# Patient Record
Sex: FEMALE | Race: WHITE
[De-identification: names, ages, dates, MRNs, and addresses within clinical notes are randomized per-mention and may not be internally consistent; named-entity substitution may affect disease eponyms.]

---

## 2018-09-17 ENCOUNTER — HOSPITAL ENCOUNTER (OUTPATIENT)
Dept: HOSPITAL 47 - RADMRIMAIN | Age: 48
Discharge: HOME | End: 2018-09-17
Attending: FAMILY MEDICINE
Payer: COMMERCIAL

## 2018-09-17 DIAGNOSIS — M50.222: Primary | ICD-10-CM

## 2018-09-17 PROCEDURE — 72141 MRI NECK SPINE W/O DYE: CPT

## 2018-09-17 NOTE — MR
EXAMINATION TYPE: MR cervical spine wo con

 

DATE OF EXAM: 9/17/2018

 

COMPARISON: None

 

HISTORY: Cervicalgia

 

TECHNIQUE: 

Multiplanar, multisequence images of the cervical spine were acquired.

 

C2-C3: No evidence for degenerative disc disease.  No disc bulge/herniation or protrusion.  No Canal 
stenosis.  Foramina are patent bilaterally.

 

C3-C4: No evidence for degenerative disc disease.  No disc bulge/herniation or protrusion.  No Canal 
stenosis.  Foramina are patent bilaterally.

 

C4-C5: No evidence for degenerative disc disease.  No disc bulge/herniation or protrusion.  No Canal 
stenosis.  Foramina are patent bilaterally.

 

C5-C6: There is central broad based disc bulge with more focal protrusion centrally. This comes in cl
ose approximation with the spinal cord. No AP spinal canal stenosis present. In the sagittal T1 weigh
chiara images there appears to be some subligamentous disc extension. Uncovertebral joint atrophy is pre
sent with some moderate foraminal stenosis bilaterally.

 

C6-C7: Mild right paracentral disc bulge is present with mild anterior thecal sac compression. No AP 
spinal canal stenosis present. No cord contact is evident. Subligamentous disc extension may be prese
nt on the sagittal T1 images. Neural foramen are patent.

 

C7-T1: No evidence for degenerative disc disease.  No disc bulge/herniation or protrusion.  No Canal 
stenosis.  Foramina are patent bilaterally.

 

Vertebral body heights are preserved. Mild disc desiccation is present diffusely. Disc heights appear
 preserved.

 

IMPRESSION:

 

1. Central disc bulging with subligamentous disc extension C5-6 with moderate anterior thecal sac com
pression.

2. Mild right paracentral disc bulging with possible subligamentous disc extension at C6-7 with mild 
anterior thecal sac compression.

## 2018-11-07 ENCOUNTER — HOSPITAL ENCOUNTER (OUTPATIENT)
Dept: HOSPITAL 47 - PNWHC3 | Age: 48
Discharge: HOME | End: 2018-11-07
Attending: ANESTHESIOLOGY
Payer: COMMERCIAL

## 2018-11-07 VITALS — RESPIRATION RATE: 18 BRPM | HEART RATE: 99 BPM

## 2018-11-07 VITALS — SYSTOLIC BLOOD PRESSURE: 161 MMHG | DIASTOLIC BLOOD PRESSURE: 115 MMHG

## 2018-11-07 VITALS — BODY MASS INDEX: 25.6 KG/M2

## 2018-11-07 DIAGNOSIS — M50.222: ICD-10-CM

## 2018-11-07 DIAGNOSIS — Z79.899: ICD-10-CM

## 2018-11-07 DIAGNOSIS — R51: Primary | ICD-10-CM

## 2018-11-07 DIAGNOSIS — F17.210: ICD-10-CM

## 2018-11-07 PROCEDURE — 99211 OFF/OP EST MAY X REQ PHY/QHP: CPT

## 2018-11-07 NOTE — P.CONS
History of Present Illness





- Reason for Consult


Consult date: 11/07/18





- Chief Complaint


Occipital headache





- History of Present Illness


This is a 48-year-old lady with a one-year history of occipital headache that 

started with no precipitating events.  The headache has been getting worse.  

She describes her pain as steady pain in the back of her head which extends to 

the frontal areas of her head.  There are no exacerbating or alleviating 

factors.  The pain is worse when she wakes up in the morning.  It did occur up 

at night however not frequently.  The patient denies any numbness or tingling 

in the upper or lower extremities she also denies any weakness in the 

extremities or any bowel or bladder dysfunction.  The patient had an MRI of the 

cervical spine which showed central disc bulging at C5-C6 level with moderate 

anterior thecal sac compression and mild right paracentral disc bulging at C6 7 

level with mild anterior thecal sac compression.  She sometimes feels nauseated 

with this pain but she denies any photophobia.  This pain sometimes last for 

couple of days as she states.


She uses Flexeril and Norco for this pain.  She works at [employer] and she has 

been off duty over the last 3 months she is scheduled to go back to work on 

December 15 of this year.








Review of Systems


Constitutional: Denies chills, Denies fever


Eyes: denies blurred vision, denies pain


Ears, nose, mouth and throat: Reports as per HPI, Denies sore throat


Cardiovascular: Denies chest pain, Denies shortness of breath


Respiratory: Denies cough


Gastrointestinal: Denies abdominal pain, Denies diarrhea, Denies nausea, Denies 

vomiting


Musculoskeletal: Reports as per HPI


Neurological: Reports as per HPI





Past Medical History


Additional Past Medical History / Comment(s): RAYNAUD'S


History of Any Multi-Drug Resistant Organisms: None Reported


Past Surgical History: Tubal Ligation


Past Anesthesia/Blood Transfusion Reactions: No Reported Reaction


Past Psychological History: No Psychological Hx Reported


Smoking Status: Current every day smoker


Past Alcohol Use History: None Reported


Additional Past Alcohol Use History / Comment(s): SMOKES 1 PPD FOR PAST 30 YEARS


Past Drug Use History: None Reported





- Past Family History


  ** Father


Family Medical History: Cancer





  ** Mother


Family Medical History: Cancer





Medications and Allergies


 Home Medications











 Medication  Instructions  Recorded  Confirmed  Type


 


Cyclobenzaprine [Flexeril] 5 mg PO TID 11/06/18 11/07/18 History


 


HYDROcodone/APAP 5-325MG [Norco 1 tab PO Q6HR PRN 11/06/18 11/07/18 History





5-325]    











 Allergies











Allergy/AdvReac Type Severity Reaction Status Date / Time


 


No Known Allergies Allergy   Verified 11/07/18 12:20














Physical Exam


Vitals: 


 Vital Signs











  Pulse Resp BP Pulse Ox


 


 11/07/18 12:21  99  18  161/115  97








 Intake and Output











 11/06/18 11/07/18 11/07/18





 22:59 06:59 14:59


 


Other:   


 


  Weight 63.503 kg  














- Constitutional


General appearance: average body habitus





- EENT


Eyes: PERRLA





- Respiratory


Respiratory: bilateral: CTA





- Cardiovascular


Rhythm: regular





- Integumentary


Integumentary: no calor, no cellulitis, no cyanotic, no decreased turgor, no 

flushed, no jaundiced, no normal, no normal turgor, no pale, no rash, no ulcer





- Neurologic


Neuro exam of the upper extremities showed normal and symmetrical deep tendon 

reflexes and normal and symmetrical muscle strength.  She has mild tenderness 

on the right side of her cervical spine in the paravertebral musculature and 

also more moderate tenderness in the occipital area on both sides more on the 

right side than the left side.  She has normal range of motion of the cervical 

spine.





Neurologic: CNII-XII intact





- Musculoskeletal


Musculoskeletal: gait normal





- Psychiatric


Psychiatric: A&O x's 3, appropriate affect, intact judgment & insight





Results


Results: 


The patient had an MRI of the cervical spine which showed central disc bulging 

at C5-C6 level with moderate anterior thecal sac compression and mild right 

paracentral disc bulging at C6 7 level with mild anterior thecal sac 

compression.








Assessment and Plan


Plan: 


This is a 48-year-old lady with occipital headache.  The patient may benefit 

from getting occipital nerve block bilaterally for 1 time and if this  does not 

help her pain then we'll plan on doing the cervical C2, C3 and third occipital 

nerve block under fluoroscopic guidance in the future bilaterally.  Both 

procedures were explained to the patient and her questions were answered.


The patient has disc bulging at the lower levels of her cervical spine which 

does not explain her current symptoms and I think they are not responsible for 

her current pain.  She also has normal neurologic examination.

## 2018-11-28 ENCOUNTER — HOSPITAL ENCOUNTER (OUTPATIENT)
Dept: HOSPITAL 47 - ORPAIN | Age: 48
End: 2018-11-28
Payer: COMMERCIAL

## 2018-11-28 VITALS — HEART RATE: 95 BPM | SYSTOLIC BLOOD PRESSURE: 117 MMHG | DIASTOLIC BLOOD PRESSURE: 87 MMHG

## 2018-11-28 VITALS — BODY MASS INDEX: 25.6 KG/M2

## 2018-11-28 VITALS — RESPIRATION RATE: 18 BRPM

## 2018-11-28 DIAGNOSIS — F17.210: ICD-10-CM

## 2018-11-28 DIAGNOSIS — I73.00: ICD-10-CM

## 2018-11-28 DIAGNOSIS — Z79.899: ICD-10-CM

## 2018-11-28 DIAGNOSIS — M50.222: ICD-10-CM

## 2018-11-28 DIAGNOSIS — M54.81: Primary | ICD-10-CM

## 2018-11-28 PROCEDURE — 64405 NJX AA&/STRD GR OCPL NRV: CPT

## 2020-03-08 ENCOUNTER — HOSPITAL ENCOUNTER (INPATIENT)
Dept: HOSPITAL 47 - EC | Age: 50
LOS: 2 days | Discharge: HOME | DRG: 192 | End: 2020-03-10
Attending: HOSPITALIST | Admitting: HOSPITALIST
Payer: COMMERCIAL

## 2020-03-08 DIAGNOSIS — F17.200: ICD-10-CM

## 2020-03-08 DIAGNOSIS — Z79.899: ICD-10-CM

## 2020-03-08 DIAGNOSIS — I73.00: ICD-10-CM

## 2020-03-08 DIAGNOSIS — Z98.51: ICD-10-CM

## 2020-03-08 DIAGNOSIS — J44.0: ICD-10-CM

## 2020-03-08 DIAGNOSIS — Z80.1: ICD-10-CM

## 2020-03-08 DIAGNOSIS — I10: ICD-10-CM

## 2020-03-08 DIAGNOSIS — J44.1: Primary | ICD-10-CM

## 2020-03-08 DIAGNOSIS — E78.5: ICD-10-CM

## 2020-03-08 DIAGNOSIS — J20.9: ICD-10-CM

## 2020-03-08 DIAGNOSIS — R51: ICD-10-CM

## 2020-03-08 LAB
ALBUMIN SERPL-MCNC: 4.7 G/DL (ref 3.5–5)
ALP SERPL-CCNC: 67 U/L (ref 38–126)
ALT SERPL-CCNC: 20 U/L (ref 4–34)
ANION GAP SERPL CALC-SCNC: 8 MMOL/L
APTT BLD: 25.8 SEC (ref 22–30)
AST SERPL-CCNC: 29 U/L (ref 14–36)
BASOPHILS # BLD AUTO: 0.1 K/UL (ref 0–0.2)
BASOPHILS NFR BLD AUTO: 1 %
BUN SERPL-SCNC: 12 MG/DL (ref 7–17)
CALCIUM SPEC-MCNC: 9.6 MG/DL (ref 8.4–10.2)
CHLORIDE SERPL-SCNC: 103 MMOL/L (ref 98–107)
CO2 SERPL-SCNC: 27 MMOL/L (ref 22–30)
EOSINOPHIL # BLD AUTO: 0.3 K/UL (ref 0–0.7)
EOSINOPHIL NFR BLD AUTO: 3 %
ERYTHROCYTE [DISTWIDTH] IN BLOOD BY AUTOMATED COUNT: 4.85 M/UL (ref 3.8–5.4)
ERYTHROCYTE [DISTWIDTH] IN BLOOD: 12.8 % (ref 11.5–15.5)
GLUCOSE BLD-MCNC: 129 MG/DL (ref 75–99)
GLUCOSE SERPL-MCNC: 109 MG/DL (ref 74–99)
HCT VFR BLD AUTO: 46.8 % (ref 34–46)
HGB BLD-MCNC: 15.4 GM/DL (ref 11.4–16)
INR PPP: 1 (ref ?–1.2)
LYMPHOCYTES # SPEC AUTO: 2.6 K/UL (ref 1–4.8)
LYMPHOCYTES NFR SPEC AUTO: 26 %
MAGNESIUM SPEC-SCNC: 2 MG/DL (ref 1.6–2.3)
MCH RBC QN AUTO: 31.7 PG (ref 25–35)
MCHC RBC AUTO-ENTMCNC: 32.8 G/DL (ref 31–37)
MCV RBC AUTO: 96.6 FL (ref 80–100)
MONOCYTES # BLD AUTO: 0.7 K/UL (ref 0–1)
MONOCYTES NFR BLD AUTO: 8 %
NEUTROPHILS # BLD AUTO: 5.8 K/UL (ref 1.3–7.7)
NEUTROPHILS NFR BLD AUTO: 59 %
PH UR: 6 [PH] (ref 5–8)
PLATELET # BLD AUTO: 246 K/UL (ref 150–450)
POTASSIUM SERPL-SCNC: 4.4 MMOL/L (ref 3.5–5.1)
PROT SERPL-MCNC: 7.8 G/DL (ref 6.3–8.2)
PT BLD: 10 SEC (ref 9–12)
RBC UR QL: 9 /HPF (ref 0–5)
SODIUM SERPL-SCNC: 138 MMOL/L (ref 137–145)
SP GR UR: 1.02 (ref 1–1.03)
SQUAMOUS UR QL AUTO: 2 /HPF (ref 0–4)
UROBILINOGEN UR QL STRIP: <2 MG/DL (ref ?–2)
WBC # BLD AUTO: 9.8 K/UL (ref 3.8–10.6)
WBC #/AREA URNS HPF: 10 /HPF (ref 0–5)

## 2020-03-08 PROCEDURE — 85610 PROTHROMBIN TIME: CPT

## 2020-03-08 PROCEDURE — 85025 COMPLETE CBC W/AUTO DIFF WBC: CPT

## 2020-03-08 PROCEDURE — 85730 THROMBOPLASTIN TIME PARTIAL: CPT

## 2020-03-08 PROCEDURE — 87502 INFLUENZA DNA AMP PROBE: CPT

## 2020-03-08 PROCEDURE — 94640 AIRWAY INHALATION TREATMENT: CPT

## 2020-03-08 PROCEDURE — 87040 BLOOD CULTURE FOR BACTERIA: CPT

## 2020-03-08 PROCEDURE — 96375 TX/PRO/DX INJ NEW DRUG ADDON: CPT

## 2020-03-08 PROCEDURE — 96361 HYDRATE IV INFUSION ADD-ON: CPT

## 2020-03-08 PROCEDURE — 94760 N-INVAS EAR/PLS OXIMETRY 1: CPT

## 2020-03-08 PROCEDURE — 83880 ASSAY OF NATRIURETIC PEPTIDE: CPT

## 2020-03-08 PROCEDURE — 80048 BASIC METABOLIC PNL TOTAL CA: CPT

## 2020-03-08 PROCEDURE — 99285 EMERGENCY DEPT VISIT HI MDM: CPT

## 2020-03-08 PROCEDURE — 71046 X-RAY EXAM CHEST 2 VIEWS: CPT

## 2020-03-08 PROCEDURE — 84484 ASSAY OF TROPONIN QUANT: CPT

## 2020-03-08 PROCEDURE — 80053 COMPREHEN METABOLIC PANEL: CPT

## 2020-03-08 PROCEDURE — 83735 ASSAY OF MAGNESIUM: CPT

## 2020-03-08 PROCEDURE — 96374 THER/PROPH/DIAG INJ IV PUSH: CPT

## 2020-03-08 PROCEDURE — 93005 ELECTROCARDIOGRAM TRACING: CPT

## 2020-03-08 PROCEDURE — 36415 COLL VENOUS BLD VENIPUNCTURE: CPT

## 2020-03-08 PROCEDURE — 81001 URINALYSIS AUTO W/SCOPE: CPT

## 2020-03-08 PROCEDURE — 85379 FIBRIN DEGRADATION QUANT: CPT

## 2020-03-08 PROCEDURE — 83605 ASSAY OF LACTIC ACID: CPT

## 2020-03-08 RX ADMIN — METHYLPREDNISOLONE SODIUM SUCCINATE SCH MG: 125 INJECTION, POWDER, FOR SOLUTION INTRAMUSCULAR; INTRAVENOUS at 23:30

## 2020-03-08 RX ADMIN — INSULIN ASPART SCH: 100 INJECTION, SOLUTION INTRAVENOUS; SUBCUTANEOUS at 21:18

## 2020-03-08 RX ADMIN — CEFAZOLIN SCH MLS/HR: 330 INJECTION, POWDER, FOR SOLUTION INTRAMUSCULAR; INTRAVENOUS at 23:31

## 2020-03-08 RX ADMIN — HEPARIN SODIUM SCH UNIT: 5000 INJECTION, SOLUTION INTRAVENOUS; SUBCUTANEOUS at 21:51

## 2020-03-08 RX ADMIN — ATORVASTATIN CALCIUM SCH MG: 20 TABLET, FILM COATED ORAL at 21:22

## 2020-03-08 NOTE — HP
HISTORY AND PHYSICAL



CHIEF COMPLAINT:

Shortness of breath.



HISTORY OF PRESENT ILLNESS:

This 49-year-old woman with a past medical history of multiple medical problems

including hypertension, hyperlipidemia, history of Raynaud's being followed by Dr. Kg Bazzi in the outpatient setting, not feeling well for the past several days.  The

patient continues to smoke, but the patient is trying to quit with Chantix at this

time.  Because of worsening shortness of breath, with cough and sputum,  patient came

to [facility] emergency Room and was admitted for further evaluation and

treatment.  Initial breathing treatments made the patient feeling better but after

taking oxygen saturation dipped down, the patient was admitted for evaluation and

treatment.  The chest x-ray did not show acute abnormality.  The patient was apparently

scheduled for PFT next week.  There is no history of fever, rigors, chills at this

time.



PAST MEDICAL HISTORY:

History of hypertension, hyperlipidemia, history of Raynaud's.



MEDICATIONS:

Prior to admission include home medications are Chantix 1 mg p.o. b.i.d., Prinivil 10

mg p.o. daily, Lipitor 10 mg q.h.s., ProAir 1 to 2 puffs q.4 p.r.n.



ALLERGIES:

None.



FAMILY HISTORY:

History of cancer in the family.



SOCIAL HISTORY:

History of smoking, continued ongoing less than a pack of cigarettes per day.  No

history of alcohol intake.



REVIEW OF SYSTEMS:

ENT: No diminished vision. No diminished hearing.

CARDIOVASCULAR: No angina or palpitations.

RESPIRATORY: As mentioned earlier.  GI no nausea or vomiting.  no dysuria or

hematuria.

Nervous system: No numbness or weakness.

Allergy/Immunology: No asthma or hayfever.

Musculoskeletal as mentioned earlier.

HEMATOLOGY/ONCOLOGY: No history of anemia.

ENDOCRINE:  No history of diabetes or hypothyroidism.

Constitutional: As mentioned earlier.

DERMATOLOGY:  Negative.

RHEUMATOLOGY negative.

PSYCHIATRY as mentioned earlier.



PHYSICAL EXAMINATION:

Alert and oriented times three.  Pulse 90. Blood pressure 178/92, respiration 20,

temperature 97.2, pulse ox 98% on room air.

HEENT: Conjunctivae normal.

NECK: No JVD.

Cardiovascular: S1, S2 muffled.

Respiration: Breath sounds diminished in the bases.  Bilateral scattered rhonchi and

crackles.  Expiratory wheezing also present.

ABDOMEN:  Soft, nontender.  No mass palpable.

LEGS:  No edema. No swelling.

NERVOUS SYSTEM: Higher functions as mentioned earlier. Moves all four limbs.

LYMPHATICS: No lymph nodes palpable in the neck, axillae or groin.

SKIN: No ulcer, no rash and no bleeding.

JOINTS no active deforming arthropathy.



LABS:

CBC within normal limits and glucose 109.  UA noted.



ASSESSMENT:

1. Chronic obstructive pulmonary disease acute exacerbation with acute purulent

    tracheobronchitis.

2. History of nicotine dependence, continued ongoing.

3. History of hypertension.

4. Hyperlipidemia.

5. History of Raynaud's phenomenon.

6.



RECOMMENDATIONS AND DISCUSSION:

In this 49-year-old woman who presented with multiple complex medical issues, we will

monitor the patient closely, continue the current medications, management and

symptomatic treatment. Otherwise,  we will increase the dose of lisinopril, steroids.

Monitor blood sugars closely, intensive bronchodilators, antibiotics.  The prognosis

guarded because of multiple complex medical issues.  Further recommendations to follow.

A copy of this dictation being forwarded to Dr. Kg Bazzi who is the primary

physician. We will stop the IV fluids.  I would also do a D-dimer and if it is

positive, order a CT angio of the chest.





MMODL / IJN: [id number] / Job#: [id number]

## 2020-03-08 NOTE — XR
EXAMINATION TYPE: XR chest 2V

 

DATE OF EXAM: 3/8/2020

 

COMPARISON: NONE

 

HISTORY: Short of breath

 

TECHNIQUE:

 

FINDINGS: Heart and mediastinum are normal. Lungs are clear. Diaphragm is normal. Bony thorax is inta
ct. There are chest leads.

 

IMPRESSION: No active cardiopulmonary disease. Normal heart.

## 2020-03-08 NOTE — ED
General Adult HPI





- General


Chief complaint: Shortness of Breath


Stated complaint: MÓNICA


Time Seen by Provider: 03/08/20 16:48


Source: patient, RN notes reviewed, old records reviewed


Mode of arrival: ambulatory


Limitations: no limitations





- History of Present Illness


Initial comments: 





Patient is a 49-year-old female who presents emergency Department today with 

complaints of shortness of breath, dyspnea on exertion.  She reports that she is

a smoker and trying to quit at this time with Chantix.  She reports that she's 

had a worsening cough and worsening shortness of breath with even short 

distances with walking for the past 3 days.  She states that her cough is 

nonproductive.  She has not been officially diagnosed with COPD.  She reports 

that she's been having progressive symptoms of shortness of breath and worsening

wheezing and cough since January.  She states she is scheduled to have PFTs next

week.





- Related Data


                                Home Medications











 Medication  Instructions  Recorded  Confirmed


 


Lisinopril [Prinivil] 10 mg PO DAILY 11/28/18 03/08/20


 


Albuterol Sulfate [Proair Hfa] 1 - 2 puff INHALATION RT-Q4H PRN 03/08/20 03/08/20


 


Atorvastatin [Lipitor] 20 mg PO HS 03/08/20 03/08/20


 


Varenicline [Chantix Continuing 1 mg PO BID 03/08/20 03/08/20





Pack]   











                                    Allergies











Allergy/AdvReac Type Severity Reaction Status Date / Time


 


No Known Allergies Allergy   Verified 03/08/20 17:20














Review of Systems


ROS Statement: 


Those systems with pertinent positive or pertinent negative responses have been 

documented in the HPI.





ROS Other: All systems not noted in ROS Statement are negative.





Past Medical History


Past Medical History: Hyperlipidemia, Hypertension


Additional Past Medical History / Comment(s): RAYNAUD'S


History of Any Multi-Drug Resistant Organisms: None Reported


Past Surgical History: Tubal Ligation


Past Anesthesia/Blood Transfusion Reactions: No Reported Reaction


Past Psychological History: No Psychological Hx Reported


Smoking Status: Current every day smoker


Past Alcohol Use History: None Reported


Past Drug Use History: None Reported





- Past Family History


  ** Father


Family Medical History: Cancer





  ** Mother


Family Medical History: Cancer





General Exam





- General Exam Comments


Initial Comments: 





Pleasant 49-year-old female.  Alert and oriented.  No distress.


Limitations: no limitations


General appearance: alert, in no apparent distress


Head exam: Present: atraumatic


Eye exam: Present: normal appearance, PERRL, EOMI.  Absent: scleral icterus, 

conjunctival injection, periorbital swelling


ENT exam: Present: normal exam, mucous membranes moist


Neck exam: Present: normal inspection.  Absent: tenderness, meningismus, 

lymphadenopathy


Respiratory exam: Present: wheezes (bilaterally), decreased breath sounds.  

Absent: normal lung sounds bilaterally, respiratory distress, rales, rhonchi, 

stridor, accessory muscle use


Cardiovascular Exam: Present: regular rate, normal rhythm, normal heart sounds. 

Absent: systolic murmur, diastolic murmur, rubs, gallop, clicks


GI/Abdominal exam: Present: soft, normal bowel sounds.  Absent: distended, 

tenderness, guarding, rebound, rigid


Extremities exam: Present: normal inspection, full ROM, normal capillary refill.

 Absent: tenderness, pedal edema, joint swelling, calf tenderness


Back exam: Present: normal inspection


Neurological exam: Present: alert, oriented X3, CN II-XII intact


Psychiatric exam: Present: normal affect, normal mood





Course


                                   Vital Signs











  03/08/20 03/08/20 03/08/20





  16:42 17:02 17:05


 


Temperature 97.9 F  


 


Pulse Rate 93  93


 


Respiratory 22 18 





Rate   


 


Blood Pressure 178/98  


 


O2 Sat by Pulse 96  





Oximetry   














  03/08/20 03/08/20 03/08/20





  17:17 19:05 19:16


 


Temperature   


 


Pulse Rate 90 96 98


 


Respiratory   18





Rate   


 


Blood Pressure   171/101


 


O2 Sat by Pulse   95





Oximetry   














  03/08/20





  19:17


 


Temperature 


 


Pulse Rate 105 H


 


Respiratory 





Rate 


 


Blood Pressure 


 


O2 Sat by Pulse 





Oximetry 














Medical Decision Making





- Medical Decision Making





49-year-old female presenting for his murmurs today for cough and worsening 

shortness breath with exertion wheezing worsening over the past 2-3 days.  She 

reports that she has not been officially diagnosed with COPD.  On exam she is 

wheezing.  Patient was given IV fluids, steroids and breathing treatments.  

Wheezing has improved.  Patient had an ambulatory test around the emergency 

department and pulse ox went as well as 80% Patient was very winded and felt 

lightheaded at that time.  On reevaluation after this she continue to have some 

wheezing.  Patient case discussed with Dr. Cheng.  Due to persistent hypoxia on 

exertion wheezing  will admit the Patient for COPD exacerbation.  She was given 

Rocephin and azithromycin.





- Lab Data


Result diagrams: 


                                 03/08/20 17:24





                                 03/08/20 17:24


                                   Lab Results











  03/08/20 03/08/20 03/08/20 Range/Units





  17:24 17:24 17:24 


 


WBC  9.8    (3.8-10.6)  k/uL


 


RBC  4.85    (3.80-5.40)  m/uL


 


Hgb  15.4    (11.4-16.0)  gm/dL


 


Hct  46.8 H    (34.0-46.0)  %


 


MCV  96.6    (80.0-100.0)  fL


 


MCH  31.7    (25.0-35.0)  pg


 


MCHC  32.8    (31.0-37.0)  g/dL


 


RDW  12.8    (11.5-15.5)  %


 


Plt Count  246    (150-450)  k/uL


 


Neutrophils %  59    %


 


Lymphocytes %  26    %


 


Monocytes %  8    %


 


Eosinophils %  3    %


 


Basophils %  1    %


 


Neutrophils #  5.8    (1.3-7.7)  k/uL


 


Lymphocytes #  2.6    (1.0-4.8)  k/uL


 


Monocytes #  0.7    (0-1.0)  k/uL


 


Eosinophils #  0.3    (0-0.7)  k/uL


 


Basophils #  0.1    (0-0.2)  k/uL


 


PT   10.0   (9.0-12.0)  sec


 


INR   1.0   (<1.2)  


 


APTT   25.8   (22.0-30.0)  sec


 


Sodium    138  (137-145)  mmol/L


 


Potassium    4.4  (3.5-5.1)  mmol/L


 


Chloride    103  ()  mmol/L


 


Carbon Dioxide    27  (22-30)  mmol/L


 


Anion Gap    8  mmol/L


 


BUN    12  (7-17)  mg/dL


 


Creatinine    0.63  (0.52-1.04)  mg/dL


 


Est GFR (CKD-EPI)AfAm    >90  (>60 ml/min/1.73 sqM)  


 


Est GFR (CKD-EPI)NonAf    >90  (>60 ml/min/1.73 sqM)  


 


Glucose    109 H  (74-99)  mg/dL


 


Plasma Lactic Acid Georgi     (0.7-2.0)  mmol/L


 


Calcium    9.6  (8.4-10.2)  mg/dL


 


Magnesium    2.0  (1.6-2.3)  mg/dL


 


Total Bilirubin    0.4  (0.2-1.3)  mg/dL


 


AST    29  (14-36)  U/L


 


ALT    20  (4-34)  U/L


 


Alkaline Phosphatase    67  ()  U/L


 


Troponin I     (0.000-0.034)  ng/mL


 


NT-Pro-B Natriuret Pep     pg/mL


 


Total Protein    7.8  (6.3-8.2)  g/dL


 


Albumin    4.7  (3.5-5.0)  g/dL


 


Urine Color     


 


Urine Appearance     (Clear)  


 


Urine pH     (5.0-8.0)  


 


Ur Specific Gravity     (1.001-1.035)  


 


Urine Protein     (Negative)  


 


Urine Glucose (UA)     (Negative)  


 


Urine Ketones     (Negative)  


 


Urine Blood     (Negative)  


 


Urine Nitrite     (Negative)  


 


Urine Bilirubin     (Negative)  


 


Urine Urobilinogen     (<2.0)  mg/dL


 


Ur Leukocyte Esterase     (Negative)  


 


Urine RBC     (0-5)  /hpf


 


Urine WBC     (0-5)  /hpf


 


Ur Squamous Epith Cells     (0-4)  /hpf


 


Urine Bacteria     (None)  /hpf


 


Urine Mucus     (None)  /hpf














  03/08/20 03/08/20 03/08/20 Range/Units





  17:24 17:24 17:24 


 


WBC     (3.8-10.6)  k/uL


 


RBC     (3.80-5.40)  m/uL


 


Hgb     (11.4-16.0)  gm/dL


 


Hct     (34.0-46.0)  %


 


MCV     (80.0-100.0)  fL


 


MCH     (25.0-35.0)  pg


 


MCHC     (31.0-37.0)  g/dL


 


RDW     (11.5-15.5)  %


 


Plt Count     (150-450)  k/uL


 


Neutrophils %     %


 


Lymphocytes %     %


 


Monocytes %     %


 


Eosinophils %     %


 


Basophils %     %


 


Neutrophils #     (1.3-7.7)  k/uL


 


Lymphocytes #     (1.0-4.8)  k/uL


 


Monocytes #     (0-1.0)  k/uL


 


Eosinophils #     (0-0.7)  k/uL


 


Basophils #     (0-0.2)  k/uL


 


PT     (9.0-12.0)  sec


 


INR     (<1.2)  


 


APTT     (22.0-30.0)  sec


 


Sodium     (137-145)  mmol/L


 


Potassium     (3.5-5.1)  mmol/L


 


Chloride     ()  mmol/L


 


Carbon Dioxide     (22-30)  mmol/L


 


Anion Gap     mmol/L


 


BUN     (7-17)  mg/dL


 


Creatinine     (0.52-1.04)  mg/dL


 


Est GFR (CKD-EPI)AfAm     (>60 ml/min/1.73 sqM)  


 


Est GFR (CKD-EPI)NonAf     (>60 ml/min/1.73 sqM)  


 


Glucose     (74-99)  mg/dL


 


Plasma Lactic Acid Georgi  1.0    (0.7-2.0)  mmol/L


 


Calcium     (8.4-10.2)  mg/dL


 


Magnesium     (1.6-2.3)  mg/dL


 


Total Bilirubin     (0.2-1.3)  mg/dL


 


AST     (14-36)  U/L


 


ALT     (4-34)  U/L


 


Alkaline Phosphatase     ()  U/L


 


Troponin I   <0.012   (0.000-0.034)  ng/mL


 


NT-Pro-B Natriuret Pep    59  pg/mL


 


Total Protein     (6.3-8.2)  g/dL


 


Albumin     (3.5-5.0)  g/dL


 


Urine Color     


 


Urine Appearance     (Clear)  


 


Urine pH     (5.0-8.0)  


 


Ur Specific Gravity     (1.001-1.035)  


 


Urine Protein     (Negative)  


 


Urine Glucose (UA)     (Negative)  


 


Urine Ketones     (Negative)  


 


Urine Blood     (Negative)  


 


Urine Nitrite     (Negative)  


 


Urine Bilirubin     (Negative)  


 


Urine Urobilinogen     (<2.0)  mg/dL


 


Ur Leukocyte Esterase     (Negative)  


 


Urine RBC     (0-5)  /hpf


 


Urine WBC     (0-5)  /hpf


 


Ur Squamous Epith Cells     (0-4)  /hpf


 


Urine Bacteria     (None)  /hpf


 


Urine Mucus     (None)  /hpf














  03/08/20 Range/Units





  18:10 


 


WBC   (3.8-10.6)  k/uL


 


RBC   (3.80-5.40)  m/uL


 


Hgb   (11.4-16.0)  gm/dL


 


Hct   (34.0-46.0)  %


 


MCV   (80.0-100.0)  fL


 


MCH   (25.0-35.0)  pg


 


MCHC   (31.0-37.0)  g/dL


 


RDW   (11.5-15.5)  %


 


Plt Count   (150-450)  k/uL


 


Neutrophils %   %


 


Lymphocytes %   %


 


Monocytes %   %


 


Eosinophils %   %


 


Basophils %   %


 


Neutrophils #   (1.3-7.7)  k/uL


 


Lymphocytes #   (1.0-4.8)  k/uL


 


Monocytes #   (0-1.0)  k/uL


 


Eosinophils #   (0-0.7)  k/uL


 


Basophils #   (0-0.2)  k/uL


 


PT   (9.0-12.0)  sec


 


INR   (<1.2)  


 


APTT   (22.0-30.0)  sec


 


Sodium   (137-145)  mmol/L


 


Potassium   (3.5-5.1)  mmol/L


 


Chloride   ()  mmol/L


 


Carbon Dioxide   (22-30)  mmol/L


 


Anion Gap   mmol/L


 


BUN   (7-17)  mg/dL


 


Creatinine   (0.52-1.04)  mg/dL


 


Est GFR (CKD-EPI)AfAm   (>60 ml/min/1.73 sqM)  


 


Est GFR (CKD-EPI)NonAf   (>60 ml/min/1.73 sqM)  


 


Glucose   (74-99)  mg/dL


 


Plasma Lactic Acid Georgi   (0.7-2.0)  mmol/L


 


Calcium   (8.4-10.2)  mg/dL


 


Magnesium   (1.6-2.3)  mg/dL


 


Total Bilirubin   (0.2-1.3)  mg/dL


 


AST   (14-36)  U/L


 


ALT   (4-34)  U/L


 


Alkaline Phosphatase   ()  U/L


 


Troponin I   (0.000-0.034)  ng/mL


 


NT-Pro-B Natriuret Pep   pg/mL


 


Total Protein   (6.3-8.2)  g/dL


 


Albumin   (3.5-5.0)  g/dL


 


Urine Color  Yellow  


 


Urine Appearance  Cloudy H  (Clear)  


 


Urine pH  6.0  (5.0-8.0)  


 


Ur Specific Gravity  1.022  (1.001-1.035)  


 


Urine Protein  Negative  (Negative)  


 


Urine Glucose (UA)  Negative  (Negative)  


 


Urine Ketones  Negative  (Negative)  


 


Urine Blood  Small H  (Negative)  


 


Urine Nitrite  Positive H  (Negative)  


 


Urine Bilirubin  Negative  (Negative)  


 


Urine Urobilinogen  <2.0  (<2.0)  mg/dL


 


Ur Leukocyte Esterase  Moderate H  (Negative)  


 


Urine RBC  9 H  (0-5)  /hpf


 


Urine WBC  10 H  (0-5)  /hpf


 


Ur Squamous Epith Cells  2  (0-4)  /hpf


 


Urine Bacteria  Many H  (None)  /hpf


 


Urine Mucus  Rare H  (None)  /hpf














03/08/20 17:03


EKG performed at 1655 shows normal sinus rhythm with sinus arrhythmia.  Normal 

EKG noted.  Ventricular rate of 88 bpm.  IL interval is 140 ms.  QRS duration is

84 ms.  QT QTc is 366/442 ms.





- Radiology Data


Radiology results: report reviewed


Chest x-rays shows no active cardio pulmonary disease.  Normal heart.





Disposition


Clinical Impression: 


 COPD exacerbation, Smoker, Hypoxia, Dyspnea on exertion





Disposition: ADMITTED AS IP TO THIS HOSP


Condition: Stable


Is patient prescribed a controlled substance at d/c from ED?: No


Referrals: 


Haseeb Bazzi MD [Primary Care Provider] - 1-2 days


Time of Disposition: 19:28

## 2020-03-09 LAB
ANION GAP SERPL CALC-SCNC: 10 MMOL/L
BASOPHILS # BLD AUTO: 0 K/UL (ref 0–0.2)
BASOPHILS NFR BLD AUTO: 0 %
BUN SERPL-SCNC: 10 MG/DL (ref 7–17)
CALCIUM SPEC-MCNC: 9.6 MG/DL (ref 8.4–10.2)
CHLORIDE SERPL-SCNC: 109 MMOL/L (ref 98–107)
CO2 SERPL-SCNC: 21 MMOL/L (ref 22–30)
EOSINOPHIL # BLD AUTO: 0.1 K/UL (ref 0–0.7)
EOSINOPHIL NFR BLD AUTO: 1 %
ERYTHROCYTE [DISTWIDTH] IN BLOOD BY AUTOMATED COUNT: 4.9 M/UL (ref 3.8–5.4)
ERYTHROCYTE [DISTWIDTH] IN BLOOD: 12.8 % (ref 11.5–15.5)
GLUCOSE BLD-MCNC: 138 MG/DL (ref 75–99)
GLUCOSE BLD-MCNC: 148 MG/DL (ref 75–99)
GLUCOSE BLD-MCNC: 154 MG/DL (ref 75–99)
GLUCOSE BLD-MCNC: 168 MG/DL (ref 75–99)
GLUCOSE SERPL-MCNC: 148 MG/DL (ref 74–99)
HCT VFR BLD AUTO: 48.3 % (ref 34–46)
HGB BLD-MCNC: 15.6 GM/DL (ref 11.4–16)
LYMPHOCYTES # SPEC AUTO: 0.8 K/UL (ref 1–4.8)
LYMPHOCYTES NFR SPEC AUTO: 7 %
MCH RBC QN AUTO: 31.8 PG (ref 25–35)
MCHC RBC AUTO-ENTMCNC: 32.3 G/DL (ref 31–37)
MCV RBC AUTO: 98.5 FL (ref 80–100)
MONOCYTES # BLD AUTO: 0.1 K/UL (ref 0–1)
MONOCYTES NFR BLD AUTO: 1 %
NEUTROPHILS # BLD AUTO: 10 K/UL (ref 1.3–7.7)
NEUTROPHILS NFR BLD AUTO: 91 %
PLATELET # BLD AUTO: 271 K/UL (ref 150–450)
POTASSIUM SERPL-SCNC: 4.3 MMOL/L (ref 3.5–5.1)
SODIUM SERPL-SCNC: 140 MMOL/L (ref 137–145)
WBC # BLD AUTO: 11.1 K/UL (ref 3.8–10.6)

## 2020-03-09 RX ADMIN — METHYLPREDNISOLONE SODIUM SUCCINATE SCH MG: 125 INJECTION, POWDER, FOR SOLUTION INTRAMUSCULAR; INTRAVENOUS at 05:29

## 2020-03-09 RX ADMIN — INSULIN ASPART SCH UNIT: 100 INJECTION, SOLUTION INTRAVENOUS; SUBCUTANEOUS at 21:50

## 2020-03-09 RX ADMIN — VARENICLINE SCH MG: 1 TABLET, FILM COATED ORAL at 21:50

## 2020-03-09 RX ADMIN — INSULIN ASPART SCH: 100 INJECTION, SOLUTION INTRAVENOUS; SUBCUTANEOUS at 11:52

## 2020-03-09 RX ADMIN — IPRATROPIUM BROMIDE AND ALBUTEROL SULFATE SCH ML: .5; 3 SOLUTION RESPIRATORY (INHALATION) at 11:55

## 2020-03-09 RX ADMIN — VARENICLINE SCH MG: 1 TABLET, FILM COATED ORAL at 09:15

## 2020-03-09 RX ADMIN — IPRATROPIUM BROMIDE AND ALBUTEROL SULFATE SCH ML: .5; 3 SOLUTION RESPIRATORY (INHALATION) at 08:39

## 2020-03-09 RX ADMIN — BUDESONIDE SCH MG: 1 SUSPENSION RESPIRATORY (INHALATION) at 20:34

## 2020-03-09 RX ADMIN — ATORVASTATIN CALCIUM SCH MG: 20 TABLET, FILM COATED ORAL at 21:49

## 2020-03-09 RX ADMIN — INSULIN ASPART SCH UNIT: 100 INJECTION, SOLUTION INTRAVENOUS; SUBCUTANEOUS at 07:36

## 2020-03-09 RX ADMIN — METHYLPREDNISOLONE SODIUM SUCCINATE SCH MG: 125 INJECTION, POWDER, FOR SOLUTION INTRAMUSCULAR; INTRAVENOUS at 12:05

## 2020-03-09 RX ADMIN — LISINOPRIL SCH MG: 20 TABLET ORAL at 07:37

## 2020-03-09 RX ADMIN — LISINOPRIL SCH MG: 20 TABLET ORAL at 21:50

## 2020-03-09 RX ADMIN — METHYLPREDNISOLONE SODIUM SUCCINATE SCH MG: 125 INJECTION, POWDER, FOR SOLUTION INTRAMUSCULAR; INTRAVENOUS at 17:00

## 2020-03-09 RX ADMIN — CEFAZOLIN SCH MLS/HR: 330 INJECTION, POWDER, FOR SOLUTION INTRAMUSCULAR; INTRAVENOUS at 10:20

## 2020-03-09 RX ADMIN — IPRATROPIUM BROMIDE AND ALBUTEROL SULFATE SCH ML: .5; 3 SOLUTION RESPIRATORY (INHALATION) at 17:02

## 2020-03-09 RX ADMIN — INSULIN ASPART SCH UNIT: 100 INJECTION, SOLUTION INTRAVENOUS; SUBCUTANEOUS at 17:23

## 2020-03-09 RX ADMIN — METHYLPREDNISOLONE SODIUM SUCCINATE SCH MG: 125 INJECTION, POWDER, FOR SOLUTION INTRAMUSCULAR; INTRAVENOUS at 23:39

## 2020-03-09 RX ADMIN — AZITHROMYCIN SCH MG: 500 TABLET, FILM COATED ORAL at 07:37

## 2020-03-09 RX ADMIN — BUDESONIDE SCH MG: 1 SUSPENSION RESPIRATORY (INHALATION) at 08:40

## 2020-03-09 RX ADMIN — FORMOTEROL FUMARATE DIHYDRATE SCH MCG: 20 SOLUTION RESPIRATORY (INHALATION) at 20:34

## 2020-03-09 RX ADMIN — HEPARIN SODIUM SCH UNIT: 5000 INJECTION, SOLUTION INTRAVENOUS; SUBCUTANEOUS at 07:36

## 2020-03-09 RX ADMIN — FORMOTEROL FUMARATE DIHYDRATE SCH MCG: 20 SOLUTION RESPIRATORY (INHALATION) at 08:39

## 2020-03-09 RX ADMIN — HEPARIN SODIUM SCH UNIT: 5000 INJECTION, SOLUTION INTRAVENOUS; SUBCUTANEOUS at 21:49

## 2020-03-09 RX ADMIN — IPRATROPIUM BROMIDE AND ALBUTEROL SULFATE SCH ML: .5; 3 SOLUTION RESPIRATORY (INHALATION) at 20:34

## 2020-03-09 RX ADMIN — PANTOPRAZOLE SODIUM SCH MG: 40 TABLET, DELAYED RELEASE ORAL at 07:36

## 2020-03-09 NOTE — PN
PROGRESS NOTE



DATE OF SERVICE:

03/09/2020



This 49-year-old woman was admitted shortness of breath and COPD acute exacerbation,

being closely monitored.  Patient has some wheezing and shortness of breath.  Dr. Akers is following the patient closely.  No chest pain, no palpitation.  PFT

outpatient recommended.



EXAM:

Alert and oriented x3.  Pulse 104, blood pressure 123/81, respiration 17, temperature

98.2, pulse ox 96% on 2 L.

HEENT:  Conjunctivae normal. Oral mucosa moist.

NECK:  No jugular venous distention.  No lymph node enlargement.

CARDIOVASCULAR:  S1, S2.

RESPIRATORY: Diminished breath sounds at the bases. Bilateral scattered rhonchi and

crackles.  Expiratory wheezing also present.

ABDOMEN: Soft, nontender.

LEGS: No edema, no swelling.

NERVOUS SYSTEM: No focal deficits.



LAB STUDIES:

WBC 7.1, hemoglobin 15.2, sodium 140, potassium (  ).  Influenza negative.

Assessment.

1. Chronic obstructive pulmonary disease exacerbation with acute purulent

    tracheobronchitis.

2. History of nicotine dependence, continued, ongoing.

3. History of hypertension.

4. History of hyperlipidemia.

5. History Raynaud's phenomenon.



RECOMMENDATIONS AND DISCUSSION:

In this 49-year-old woman who presented with multiple complex medical issues, we will

monitor the patient closely, continue the current management and symptomatic treatment.

Will continue with intensive bronchodilators, IV antibiotics as well as steroids.

Closely follow with Dr. Akers.  DVT prophylaxis.  Cut down the IV fluids as

mentioned earlier.  Patient will need further evaluation including outpatient PFT for

completion of the workup.  Otherwise, continue to monitor.  Prognosis guarded.  Patient

will be followed by Dr. Kg Bazzi in with Dr. Wetzel in the outpatient setting.





MMODL / IJN: [id number] / Job#: [id number]

## 2020-03-09 NOTE — P.CNPUL
History of Present Illness


Consult date: 03/09/20


Reason for consult: dyspnea, COPD


History of present illness: 





Visit.  Pleasant 69-year-old female patient.  The patient has COPD.  The patient

has more than 30-pack-year smoking history.  The patient works at [employer].  The patient's father was my patient and I treated him for lung cancer 

in the past.  The patient has been having for bleeding for the past year at 

least.  The patient has a few bouts of bronchitis she is through Dr. Stromberg 

her primary care physician and outpatient basis.  This is a persistent physician

for COPD exacerbation.  No pleurisy.  No hemoptysis.  She has a congested cough.

 Chest x-ray shows no acute abnormalities.  Influenza screen is negative.  The 

patient was given a combination of bronchodilators and steroids and antibiotics.

 No pleurisy.  No DVTs.  No pulmonary embolism.  No previous history of 

childhood asthma.  No exposure to respiratory irritants or chemicals.  She uses 

only prior rescue inhaler on as-needed basis.  She does not use any other form 

of maintenance inhalers.  She is on no oxygen on outpatient basis.





Review of Systems


Constitutional: Denies chills, Denies fever


Eyes: denies as per HPI, denies blurred vision, denies bulging eye, denies 

decreased vision, denies diplopia, denies discharge, denies dry eye, denies 

irritation, denies itching, denies pain, denies photophobia, denies loss of 

peripheral vision, denies loss of vision, denies tunnel vision/blind spots


Ears: deny: decreased hearing, ear discharge, earache, tinnitus


Ears, nose, mouth and throat: Denies headache, Denies sore throat


Breasts: absent: as per HPI, change in shape, gynecomastia, masses, nipple 

discharge, pain, skin changes, swelling


Cardiovascular: Denies chest pain, Denies shortness of breath


Respiratory: Reports cough, Reports dyspnea, Reports wheezing


Gastrointestinal: Reports as per HPI


Genitourinary: Reports as per HPI


Menstruation: Reports as per HPI


Musculoskeletal: Reports as per HPI


Musculoskeletal: absent: ankle pain, ankle stiffness, ankle swelling, as per 

HPI, elbow pain, elbow stiffness, elbow swelling, foot pain, foot stiffness, 

foot swelling, hand pain, hand stiffness, hand swelling, hip pain, hip 

stiffness, hip swelling, knee pain, knee stiffness, knee swelling, shoulder 

pain, shoulder stiffness, shoulder swelling, wrist pain, wrist stiffness, wrist 

swelling


Integumentary: Reports as per HPI


Neurological: Reports headaches


Psychiatric: Reports as per HPI


Endocrine: Reports as per HPI


Hematologic/Lymphatic: Reports as per HPI


Allergic/Immunologic: Reports as per HPI





Past Medical History


Past Medical History: Hyperlipidemia, Hypertension


Additional Past Medical History / Comment(s): COPD, chronic headaches and 

Edmonds disease


History of Any Multi-Drug Resistant Organisms: None Reported


Past Surgical History: Tubal Ligation


Past Anesthesia/Blood Transfusion Reactions: No Reported Reaction


Past Psychological History: No Psychological Hx Reported


Smoking Status: Current every day smoker


Past Alcohol Use History: None Reported


Additional Past Alcohol Use History / Comment(s): SMOKES 1 PPD FOR PAST 30 YEARS


Past Drug Use History: None Reported





- Past Family History


  ** Father


Family Medical History: Cancer (lung cancer)





  ** Mother


Family Medical History: Cancer





Medications and Allergies


                                Home Medications











 Medication  Instructions  Recorded  Confirmed  Type


 


Lisinopril [Prinivil] 10 mg PO DAILY 11/28/18 03/08/20 History


 


Albuterol Sulfate [Proair Hfa] 1 - 2 puff INHALATION RT-Q4H PRN 03/08/20 03/08/ 20 History


 


Atorvastatin [Lipitor] 20 mg PO HS 03/08/20 03/08/20 History


 


Varenicline [Chantix Continuing 1 mg PO BID 03/08/20 03/08/20 History





Pack]    








                                    Allergies











Allergy/AdvReac Type Severity Reaction Status Date / Time


 


No Known Allergies Allergy   Verified 03/08/20 17:20














Physical Exam


Vitals: 


                                   Vital Signs











  Temp Pulse Pulse Resp BP BP Pulse Ox


 


 03/09/20 12:04   100     


 


 03/09/20 11:57   92     


 


 03/09/20 09:05   96     


 


 03/09/20 08:56   100     


 


 03/09/20 08:55   100     


 


 03/09/20 08:45   96     


 


 03/09/20 06:59  97.1 F L   91  16   156/83  96


 


 03/09/20 01:14  97.8 F   96  14   176/74  96


 


 03/08/20 20:58  97.8 F   95  16   181/88  96


 


 03/08/20 20:04  98.0 F  92   18  140/97   94 L


 


 03/08/20 19:17   105 H     


 


 03/08/20 19:16   98   18  171/101   95


 


 03/08/20 19:05   96     


 


 03/08/20 17:17   90     


 


 03/08/20 17:05   93     


 


 03/08/20 17:02     18   


 


 03/08/20 16:42  97.9 F  93   22  178/98   96








                                Intake and Output











 03/08/20 03/09/20 03/09/20





 22:59 06:59 14:59


 


Intake Total 120 360 480


 


Output Total  200 


 


Balance 120 160 480


 


Intake:   


 


  IV   480


 


    Sodium Chloride 0.9% 1,   480





    000 ml @ 60 mls/hr IV .   





    [id number] [facility] Rx#:[id number]   


 


  Intake, IV Titration 120 360 





  Amount   


 


    Sodium Chloride 0.9% 1, 120 360 





    000 ml @ 60 mls/hr IV .   





    [id number] [facility] Rx#:[id number]   


 


  Oral   0


 


Output:   


 


  Urine  200 


 


Other:   


 


  Voiding Method Toilet Toilet 


 


  # Voids  2 3


 


  Weight 65.771 kg  














The patient appeared well nourished and normally developed. Vital signs as 

documented. Head exam is unremarkable. No scleral icterus or corneal arcus 

noted. Neck is without jugular venous distension, thyromegaly, or carotid 

bruits. Carotid upstrokes are brisk bilaterally. Lungs are diminished breath 

sounds along with diffuse expiratory wheezes throughout the lung fields 

bilaterally.  Cardiac exam reveals the PMI to be normally sized and situated. 

Rhythm is regular. First and second heart sounds normal. No murmurs, rubs or 

gallops. Abdominal exam reveals normal bowel sounds, no masses, no organomegaly 

and no aortic enlargement. Extremities are nonedematous and both femoral and 

pedal pulses are normal.Examination of the skin revealed no evidence of 

significant rashes, suspicious appearing nevi or other concerning lesions.





Results





- Laboratory Findings


CBC and BMP: 


                                 03/09/20 07:27





                                 03/09/20 07:27


ABG











WBC  11.1 k/uL (3.8-10.6)  H  03/09/20  07:27    


 


RBC  4.90 m/uL (3.80-5.40)   03/09/20  07:27    


 


Hgb  15.6 gm/dL (11.4-16.0)   03/09/20  07:27    


 


Hct  48.3 % (34.0-46.0)  H  03/09/20  07:27    


 


MCV  98.5 fL (80.0-100.0)   03/09/20  07:27    


 


MCH  31.8 pg (25.0-35.0)   03/09/20  07:27    


 


MCHC  32.3 g/dL (31.0-37.0)   03/09/20  07:27    


 


RDW  12.8 % (11.5-15.5)   03/09/20  07:27    


 


Plt Count  271 k/uL (150-450)   03/09/20  07:27    


 


Neutrophils %  91 %  03/09/20  07:27    


 


Lymphocytes %  7 %  03/09/20  07:27    


 


Monocytes %  1 %  03/09/20  07:27    


 


Eosinophils %  1 %  03/09/20  07:27    


 


Basophils %  0 %  03/09/20  07:27    


 


Neutrophils #  10.0 k/uL (1.3-7.7)  H  03/09/20  07:27    


 


Lymphocytes #  0.8 k/uL (1.0-4.8)  L  03/09/20  07:27    


 


Monocytes #  0.1 k/uL (0-1.0)   03/09/20  07:27    


 


Eosinophils #  0.1 k/uL (0-0.7)   03/09/20  07:27    


 


Basophils #  0.0 k/uL (0-0.2)   03/09/20  07:27    


 


PT  10.0 sec (9.0-12.0)   03/08/20  17:24    


 


INR  1.0  (<1.2)   03/08/20  17:24    


 


APTT  25.8 sec (22.0-30.0)   03/08/20  17:24    


 


D-Dimer  0.23 mg/L FEU (<0.60)   03/08/20  17:24    


 


Sodium  140 mmol/L (137-145)   03/09/20  07:27    


 


Potassium  4.3 mmol/L (3.5-5.1)   03/09/20  07:27    


 


Chloride  109 mmol/L ()  H  03/09/20  07:27    


 


Carbon Dioxide  21 mmol/L (22-30)  L  03/09/20  07:27    


 


Anion Gap  10 mmol/L  03/09/20  07:27    


 


BUN  10 mg/dL (7-17)   03/09/20  07:27    


 


Creatinine  0.57 mg/dL (0.52-1.04)   03/09/20  07:27    


 


Est GFR (CKD-EPI)AfAm  >90  (>60 ml/min/1.73 sqM)   03/09/20  07:27    


 


Est GFR (CKD-EPI)NonAf  >90  (>60 ml/min/1.73 sqM)   03/09/20  07:27    


 


Glucose  148 mg/dL (74-99)  H  03/09/20  07:27    


 


POC Glucose (mg/dL)  138 mg/dL (75-99)  H  03/09/20  11:50    


 


POC Glu Operater Inocencia Diaz   03/09/20  11:50    


 


Plasma Lactic Acid Georgi  1.0 mmol/L (0.7-2.0)   03/08/20  17:24    


 


Calcium  9.6 mg/dL (8.4-10.2)   03/09/20  07:27    


 


Magnesium  2.0 mg/dL (1.6-2.3)   03/08/20  17:24    


 


Total Bilirubin  0.4 mg/dL (0.2-1.3)   03/08/20  17:24    


 


AST  29 U/L (14-36)   03/08/20  17:24    


 


ALT  20 U/L (4-34)   03/08/20  17:24    


 


Alkaline Phosphatase  67 U/L ()   03/08/20  17:24    


 


Troponin I  <0.012 ng/mL (0.000-0.034)   03/08/20  17:24    


 


NT-Pro-B Natriuret Pep  59 pg/mL  03/08/20  17:24    


 


Total Protein  7.8 g/dL (6.3-8.2)   03/08/20  17:24    


 


Albumin  4.7 g/dL (3.5-5.0)   03/08/20  17:24    


 


Urine Color  Yellow   03/08/20  18:10    


 


Urine Appearance  Cloudy  (Clear)  H  03/08/20  18:10    


 


Urine pH  6.0  (5.0-8.0)   03/08/20  18:10    


 


Ur Specific Gravity  1.022  (1.001-1.035)   03/08/20  18:10    


 


Urine Protein  Negative  (Negative)   03/08/20  18:10    


 


Urine Glucose (UA)  Negative  (Negative)   03/08/20  18:10    


 


Urine Ketones  Negative  (Negative)   03/08/20  18:10    


 


Urine Blood  Small  (Negative)  H  03/08/20  18:10    


 


Urine Nitrite  Positive  (Negative)  H  03/08/20  18:10    


 


Urine Bilirubin  Negative  (Negative)   03/08/20  18:10    


 


Urine Urobilinogen  <2.0 mg/dL (<2.0)   03/08/20  18:10    


 


Ur Leukocyte Esterase  Moderate  (Negative)  H  03/08/20  18:10    


 


Urine RBC  9 /hpf (0-5)  H  03/08/20  18:10    


 


Urine WBC  10 /hpf (0-5)  H  03/08/20  18:10    


 


Ur Squamous Epith Cells  2 /hpf (0-4)   03/08/20  18:10    


 


Urine Bacteria  Many /hpf (None)  H  03/08/20  18:10    


 


Urine Mucus  Rare /hpf (None)  H  03/08/20  18:10    


 


Influenza Type A RNA  Not Detected  (Not Detectd)   03/08/20  Unknown


 


Influenza Type B (PCR)  Not Detected  (Not Detectd)   03/08/20  Unknown





PT/INR, D-dimer











PT  10.0 sec (9.0-12.0)   03/08/20  17:24    


 


INR  1.0  (<1.2)   03/08/20  17:24    


 


D-Dimer  0.23 mg/L FEU (<0.60)   03/08/20  17:24    








Abnormal lab findings: 


                                  Abnormal Labs











  03/08/20 03/08/20 03/08/20





  17:24 17:24 18:10


 


WBC   


 


Hct  46.8 H  


 


Neutrophils #   


 


Lymphocytes #   


 


Chloride   


 


Carbon Dioxide   


 


Glucose   109 H 


 


POC Glucose (mg/dL)   


 


Urine Appearance    Cloudy H


 


Urine Blood    Small H


 


Urine Nitrite    Positive H


 


Ur Leukocyte Esterase    Moderate H


 


Urine RBC    9 H


 


Urine WBC    10 H


 


Urine Bacteria    Many H


 


Urine Mucus    Rare H














  03/08/20 03/09/20 03/09/20





  21:15 06:49 07:27


 


WBC    11.1 H


 


Hct    48.3 H


 


Neutrophils #    10.0 H


 


Lymphocytes #    0.8 L


 


Chloride   


 


Carbon Dioxide   


 


Glucose   


 


POC Glucose (mg/dL)  129 H  154 H 


 


Urine Appearance   


 


Urine Blood   


 


Urine Nitrite   


 


Ur Leukocyte Esterase   


 


Urine RBC   


 


Urine WBC   


 


Urine Bacteria   


 


Urine Mucus   














  03/09/20 03/09/20





  07:27 11:50


 


WBC  


 


Hct  


 


Neutrophils #  


 


Lymphocytes #  


 


Chloride  109 H 


 


Carbon Dioxide  21 L 


 


Glucose  148 H 


 


POC Glucose (mg/dL)   138 H


 


Urine Appearance  


 


Urine Blood  


 


Urine Nitrite  


 


Ur Leukocyte Esterase  


 


Urine RBC  


 


Urine WBC  


 


Urine Bacteria  


 


Urine Mucus  














- Diagnostic Findings


Chest x-ray: image reviewed





Assessment and Plan


Plan: 











1 acute COPD exacerbation with secondary shortness of breath.  Chest x-ray is 

free of any acute pulmonary infiltrates.


2 symptoms of of chronic bronchitis


3 chronic smoker


4 chronic headaches


5 Raynaud's disease


6 hypertension


7 hyperlipidemia








Plan





Agree on the current treatment


Smoking cessation counseling


Pneumonia is doubtful at this stage.


Outpatient management of COPD is to follow once the patient is fully recovered 

from her COPD exacerbation.  The patient will need obviously PFT in addition to 

maintenance respiratory medications to control her COPD more effectively.  She 

had several exacerbations.  I think her disease is at least moderate in willian

rity.  Other 1 in [place] level needs to be checked on outpatient basis.


Time with Patient: Greater than 30

## 2020-03-10 VITALS — DIASTOLIC BLOOD PRESSURE: 92 MMHG | TEMPERATURE: 98.1 F | HEART RATE: 109 BPM | SYSTOLIC BLOOD PRESSURE: 146 MMHG

## 2020-03-10 VITALS — RESPIRATION RATE: 17 BRPM

## 2020-03-10 LAB
ANION GAP SERPL CALC-SCNC: 11 MMOL/L
BASOPHILS # BLD AUTO: 0 K/UL (ref 0–0.2)
BASOPHILS NFR BLD AUTO: 0 %
BUN SERPL-SCNC: 12 MG/DL (ref 7–17)
CALCIUM SPEC-MCNC: 9.2 MG/DL (ref 8.4–10.2)
CHLORIDE SERPL-SCNC: 109 MMOL/L (ref 98–107)
CO2 SERPL-SCNC: 21 MMOL/L (ref 22–30)
EOSINOPHIL # BLD AUTO: 0 K/UL (ref 0–0.7)
EOSINOPHIL NFR BLD AUTO: 0 %
ERYTHROCYTE [DISTWIDTH] IN BLOOD BY AUTOMATED COUNT: 4.31 M/UL (ref 3.8–5.4)
ERYTHROCYTE [DISTWIDTH] IN BLOOD: 13.1 % (ref 11.5–15.5)
GLUCOSE BLD-MCNC: 127 MG/DL (ref 75–99)
GLUCOSE BLD-MCNC: 129 MG/DL (ref 75–99)
GLUCOSE SERPL-MCNC: 116 MG/DL (ref 74–99)
HCT VFR BLD AUTO: 42.3 % (ref 34–46)
HGB BLD-MCNC: 13.6 GM/DL (ref 11.4–16)
LYMPHOCYTES # SPEC AUTO: 0.9 K/UL (ref 1–4.8)
LYMPHOCYTES NFR SPEC AUTO: 5 %
MCH RBC QN AUTO: 31.5 PG (ref 25–35)
MCHC RBC AUTO-ENTMCNC: 32.1 G/DL (ref 31–37)
MCV RBC AUTO: 98.2 FL (ref 80–100)
MONOCYTES # BLD AUTO: 0.3 K/UL (ref 0–1)
MONOCYTES NFR BLD AUTO: 2 %
NEUTROPHILS # BLD AUTO: 17.4 K/UL (ref 1.3–7.7)
NEUTROPHILS NFR BLD AUTO: 93 %
PLATELET # BLD AUTO: 277 K/UL (ref 150–450)
POTASSIUM SERPL-SCNC: 4.5 MMOL/L (ref 3.5–5.1)
SODIUM SERPL-SCNC: 141 MMOL/L (ref 137–145)
WBC # BLD AUTO: 18.8 K/UL (ref 3.8–10.6)

## 2020-03-10 RX ADMIN — BUDESONIDE SCH MG: 1 SUSPENSION RESPIRATORY (INHALATION) at 07:31

## 2020-03-10 RX ADMIN — FORMOTEROL FUMARATE DIHYDRATE SCH MCG: 20 SOLUTION RESPIRATORY (INHALATION) at 07:31

## 2020-03-10 RX ADMIN — LISINOPRIL SCH MG: 20 TABLET ORAL at 08:55

## 2020-03-10 RX ADMIN — HEPARIN SODIUM SCH UNIT: 5000 INJECTION, SOLUTION INTRAVENOUS; SUBCUTANEOUS at 08:55

## 2020-03-10 RX ADMIN — METHYLPREDNISOLONE SODIUM SUCCINATE SCH MG: 125 INJECTION, POWDER, FOR SOLUTION INTRAMUSCULAR; INTRAVENOUS at 05:15

## 2020-03-10 RX ADMIN — PANTOPRAZOLE SODIUM SCH MG: 40 TABLET, DELAYED RELEASE ORAL at 08:55

## 2020-03-10 RX ADMIN — METHYLPREDNISOLONE SODIUM SUCCINATE SCH MG: 125 INJECTION, POWDER, FOR SOLUTION INTRAMUSCULAR; INTRAVENOUS at 11:39

## 2020-03-10 RX ADMIN — AZITHROMYCIN SCH MG: 500 TABLET, FILM COATED ORAL at 08:55

## 2020-03-10 RX ADMIN — INSULIN ASPART SCH: 100 INJECTION, SOLUTION INTRAVENOUS; SUBCUTANEOUS at 11:53

## 2020-03-10 RX ADMIN — VARENICLINE SCH MG: 1 TABLET, FILM COATED ORAL at 08:55

## 2020-03-10 RX ADMIN — IPRATROPIUM BROMIDE AND ALBUTEROL SULFATE SCH ML: .5; 3 SOLUTION RESPIRATORY (INHALATION) at 11:07

## 2020-03-10 RX ADMIN — IPRATROPIUM BROMIDE AND ALBUTEROL SULFATE SCH ML: .5; 3 SOLUTION RESPIRATORY (INHALATION) at 07:31

## 2020-03-10 RX ADMIN — INSULIN ASPART SCH: 100 INJECTION, SOLUTION INTRAVENOUS; SUBCUTANEOUS at 06:51

## 2020-03-10 NOTE — P.PN
Subjective


Progress Note Date: 03/10/20


Principal diagnosis: 


 Acute exacerbation of chronic obstructive pulmonary disease





 On 03/10/2020 patient seen in follow-up on general medical floor, she feels 

better.  Oxygen assessment revealed oxygen saturation above 93 percent, no 

desaturation noted, tolerated activity well in the hallway, lung sounds are 

diminished, with scattered end expiratory wheezes, no significant cough or 

congestion, patient is afebrile, vital signs are stable.  No acute events 

overnight, chest x-ray without acute pulmonary process and patient is requesting

to go home today.








Objective





- Vital Signs


Vital signs: 


                                   Vital Signs











Temp  98.2 F   03/10/20 07:07


 


Pulse  100   03/10/20 11:19


 


Resp  17   03/10/20 07:07


 


BP  154/89   03/10/20 07:07


 


Pulse Ox  95   03/10/20 07:34








                                 Intake & Output











 03/09/20 03/10/20 03/10/20





 18:59 06:59 18:59


 


Intake Total 480 960 


 


Balance 480 960 


 


Intake:   


 


   960 


 


    Sodium Chloride 0.9% 1, 480 960 





    000 ml @ 60 mls/hr IV .   





    [id number] [facility] Rx#:[id number]   


 


  Oral 0  


 


Other:   


 


  Voiding Method  Toilet 


 


  # Voids 3 1 1














- Exam


 GENERAL EXAM: Alert, active, comfortable in no apparent distress.


HEAD: Normocephalic/atraumatic.


EYES: Normal reaction of pupils, equal size.  Conjunctiva pink, sclera white.


NOSE: Clear with pink turbinates.


THROAT: No erythema or exudates.


NECK: No masses, no JVD, no thyroid enlargement, no adenopathy.


CHEST: No chest wall deformity.  Symmetrical expansion. 


LUNGS: Diminished air entry with scattered wheezes


CVS: Regular rate and rhythm, normal S1 and S2, no gallops, no murmurs, no rubs


ABDOMEN: Soft, nontender.  No hepatosplenomegaly, normal bowel sounds, no 

guarding or rigidity.


EXTREMITIES: No clubbing, no edema, no cyanosis, 2+ pulses and upper and lower 

extremities.


MUSCULOSKELETAL: Muscle strength and tone normal.


SPINE: No scoliosis or deformity


SKIN: No rashes


CENTRAL NERVOUS SYSTEM: Alert and oriented -3.  No focal deficits, tone is 

normal in all 4 extremities.


PSYCHIATRIC: Alert and oriented -3.  Appropriate affect.  Intact judgment and 

insight.











- Labs


CBC & Chem 7: 


                                 03/10/20 06:47





                                 03/10/20 06:47


Labs: 


                  Abnormal Lab Results - Last 24 Hours (Table)











  03/09/20 03/09/20 03/09/20 Range/Units





  11:50 16:59 20:50 


 


WBC     (3.8-10.6)  k/uL


 


Neutrophils #     (1.3-7.7)  k/uL


 


Lymphocytes #     (1.0-4.8)  k/uL


 


Chloride     ()  mmol/L


 


Carbon Dioxide     (22-30)  mmol/L


 


Glucose     (74-99)  mg/dL


 


POC Glucose (mg/dL)  138 H  168 H  148 H  (75-99)  mg/dL














  03/10/20 03/10/20 03/10/20 Range/Units





  06:41 06:47 06:47 


 


WBC   18.8 H   (3.8-10.6)  k/uL


 


Neutrophils #   17.4 H   (1.3-7.7)  k/uL


 


Lymphocytes #   0.9 L   (1.0-4.8)  k/uL


 


Chloride    109 H  ()  mmol/L


 


Carbon Dioxide    21 L  (22-30)  mmol/L


 


Glucose    116 H  (74-99)  mg/dL


 


POC Glucose (mg/dL)  127 H    (75-99)  mg/dL














  03/10/20 Range/Units





  11:44 


 


WBC   (3.8-10.6)  k/uL


 


Neutrophils #   (1.3-7.7)  k/uL


 


Lymphocytes #   (1.0-4.8)  k/uL


 


Chloride   ()  mmol/L


 


Carbon Dioxide   (22-30)  mmol/L


 


Glucose   (74-99)  mg/dL


 


POC Glucose (mg/dL)  129 H  (75-99)  mg/dL








                      Microbiology - Last 24 Hours (Table)











 03/08/20 17:24 Blood Culture - Preliminary





 Blood    No Growth after 24 hours














Assessment and Plan


Plan: 


 Assessment:





1 acute COPD exacerbation with secondary shortness of breath.  Chest x-ray is 

free of any acute pulmonary infiltrates.


2 symptoms of of chronic bronchitis


3 chronic smoker


4 chronic headaches


5 Raynaud's disease


6 hypertension


7 hyperlipidemia





Plan:





Obtain room oxygen assessment.  Patient is improving, she'll mildly 

bronchospastic, but no worsening dyspnea, no significant cough or congestion, 

vital signs are stable, no acute events overnight, tolerating ambulation, is req

uesting to go home today we'll speak to discharge planning and set up a 

nebulizer machine with treatments at home.  If she goes home today she will need

outpatient follow-up with Dr. Akers in the office in 7-10 days. 





I performed a history & physical examination of the patient and discussed their 

management with my nurse practitioner, Randi Hdz.  I reviewed the nurse 

practitioner's note and agree with the documented findings and plan of care.  

Lung sounds are positive for scattered  wheezes throughout the lung fields.  The

findings and the impression was discussed with the patient.  I attest to the 

documentation by the nurse practitioner. 





Time with Patient: Less than 30

## 2020-03-11 NOTE — DS
DISCHARGE SUMMARY



DATE OF SERVICE:

03/10/2020



FINAL DIAGNOSES:

1. Chronic obstructive pulmonary disease exacerbation with acute purulent

    tracheobronchitis.

2. History of nicotine dependence, continued ongoing.

3. History of hypertension.

4. History of hyperlipidemia.

5. History of Raynaud's.



DISCHARGE DISPOSITION:

The patient will be be discharged in a stable condition with guarded prognosis.



HISTORY OF PRESENT ILLNESS:

This is a 49-year-old woman with a past medical medical history of multiple medical

problems was admitted with shortness of breath and COPD exacerbation. Patient was seen

by Dr. Akers.  The cultures are negative.  Patient improved significantly with

bronchodilators, IV steroids.  Patient improved significantly.

On exam, vitals are stable.

CARDIOVASCULAR:  S1, S2.

RESPIRATION: A few scattered rhonchi.

ABDOMEN: Soft.

NERVOUS SYSTEM: No focal deficits.



The patient is keen on going home. The patient will be discharged in a stable

condition.



DISCHARGE ADVICE:

Diet is cardiac. Activity limited until followup.  Follow up with Dr. Kg Bazzi in 2-3

days, follow up with Dr. Akers as recommended.



DISCHARGE MEDICATIONS:

1. Chantix 1 mg p.o. b.i.d.

2. Lipitor 20 mg q. supper 10 mg.

3. No smoking.

4. Albuterol 1 to 2 puffs q.6 p.r.n.

5. Ceftin 500 mg p.o. b.i.d. for 3 more days.

6. DuoNeb q.i.d. and p.r.n.

7. Prednisone taper 40 mg daily for 3 days, 30 for 3 days 20 for 3 days, 10 for 3

    days.

8. Symbicort 1 puff b.i.d.

9. Zithromax 500 mg p.o. daily for 5 days.

Once again, the patient will be discharged in stable condition with guarded prognosis.





MMODL / IJN: [id number] / Job#: [id number]

## 2025-03-20 ENCOUNTER — HOSPITAL ENCOUNTER (OUTPATIENT)
Dept: HOSPITAL 47 - RADMAMWWP | Age: 55
Discharge: HOME | End: 2025-03-20
Attending: INTERNAL MEDICINE
Payer: COMMERCIAL

## 2025-03-20 DIAGNOSIS — Z12.31: Primary | ICD-10-CM

## 2025-03-20 DIAGNOSIS — Z78.0: ICD-10-CM

## 2025-03-20 DIAGNOSIS — Z80.3: ICD-10-CM

## 2025-03-20 DIAGNOSIS — R92.323: ICD-10-CM

## 2025-03-20 PROCEDURE — 77067 SCR MAMMO BI INCL CAD: CPT

## 2025-03-20 PROCEDURE — 77063 BREAST TOMOSYNTHESIS BI: CPT

## 2025-03-20 NOTE — MM
Reason for Exam: Screening  (asymptomatic). 

Last mammogram was performed 9 year(s) and 8 month(s) ago. 





Patient History: 

Menarche at age 7. First Full-Term Pregnancy at age 19. Postmenopausal. Patient has history of

breast feeding.

Maternal aunt had breast cancer. Sister had breast cancer, age 50. Mother had breast cancer, age 70.







Risk Values: 

Chata 5 year model risk: 5.5%.

NCI Lifetime model risk: 31.1%.





Prior Study Comparison: 

12/6/2006  Screening Mammogram, [facility]. 7/2/2015 Bilateral Screening Mammogram, [facility].

7/9/2015 Right Diagnostic Mammogram, [facility]. 





Tissue Density: 

There are scattered areas of fibroglandular density.





Findings: 

Analyzed By CAD. 

Benign-appearing bilateral axillary lymph nodes are present.



There is no suspicious group of microcalcifications or new suspicious mass in either breast. 





Overall Assessment: Negative, BI-RAD 1





Management: 

Screening Mammogram of both breasts in 1 year.

.



Patient should continue monthly self-breast exams.  A clinical breast exam by your physician is

recommended on an annual basis.

This exam should not preclude additional follow-up of suspicious palpable abnormalities.



Note on Chata scores and lifetime risk:

1. A Chata score greater than 3% is considered moderate risk. If this is the case, consider

specialist referral to assess eligibility for a risk reducing agent.

2. If overall lifetime risk for the development of breast cancer is 20% or higher, the patient may

qualify for future screening with alternating mammogram and breast MRI.









[facility], Workstation: [id number], 3/20/2025 8:13 AM.



Electronically signed and approved by: Guicho Cullen M.D.